# Patient Record
Sex: FEMALE | Race: BLACK OR AFRICAN AMERICAN | NOT HISPANIC OR LATINO | Employment: FULL TIME | URBAN - METROPOLITAN AREA
[De-identification: names, ages, dates, MRNs, and addresses within clinical notes are randomized per-mention and may not be internally consistent; named-entity substitution may affect disease eponyms.]

---

## 2023-12-07 ENCOUNTER — HOSPITAL ENCOUNTER (EMERGENCY)
Facility: HOSPITAL | Age: 28
Discharge: HOME | End: 2023-12-07
Payer: COMMERCIAL

## 2023-12-07 VITALS
HEART RATE: 88 BPM | RESPIRATION RATE: 16 BRPM | WEIGHT: 180 LBS | HEIGHT: 64 IN | BODY MASS INDEX: 30.73 KG/M2 | OXYGEN SATURATION: 98 % | TEMPERATURE: 97.7 F

## 2023-12-07 DIAGNOSIS — R10.32 LEFT INGUINAL PAIN: ICD-10-CM

## 2023-12-07 DIAGNOSIS — B96.89 BACTERIAL VAGINOSIS: Primary | ICD-10-CM

## 2023-12-07 DIAGNOSIS — N76.0 BACTERIAL VAGINOSIS: Primary | ICD-10-CM

## 2023-12-07 LAB
APPEARANCE UR: ABNORMAL
BILIRUB UR STRIP.AUTO-MCNC: NEGATIVE MG/DL
CLUE CELLS SPEC QL WET PREP: PRESENT
COLOR UR: YELLOW
GLUCOSE UR STRIP.AUTO-MCNC: NEGATIVE MG/DL
HCV AB SER QL: NONREACTIVE
HIV 1+2 AB+HIV1 P24 AG SERPL QL IA: NONREACTIVE
HOLD SPECIMEN: NORMAL
KETONES UR STRIP.AUTO-MCNC: ABNORMAL MG/DL
LEUKOCYTE ESTERASE UR QL STRIP.AUTO: ABNORMAL
MUCOUS THREADS #/AREA URNS AUTO: ABNORMAL /LPF
NITRITE UR QL STRIP.AUTO: NEGATIVE
PH UR STRIP.AUTO: 6 [PH]
PREGNANCY TEST URINE, POC: NEGATIVE
PROT UR STRIP.AUTO-MCNC: ABNORMAL MG/DL
RBC # UR STRIP.AUTO: NEGATIVE /UL
RBC #/AREA URNS AUTO: ABNORMAL /HPF
SP GR UR STRIP.AUTO: 1.03
SQUAMOUS #/AREA URNS AUTO: ABNORMAL /HPF
T PALLIDUM AB SER QL: NONREACTIVE
T VAGINALIS SPEC QL WET PREP: ABNORMAL
UROBILINOGEN UR STRIP.AUTO-MCNC: <2 MG/DL
WBC #/AREA URNS AUTO: ABNORMAL /HPF
WBC VAG QL WET PREP: ABNORMAL
YEAST VAG QL WET PREP: ABNORMAL

## 2023-12-07 PROCEDURE — 87389 HIV-1 AG W/HIV-1&-2 AB AG IA: CPT | Performed by: PHYSICIAN ASSISTANT

## 2023-12-07 PROCEDURE — 86803 HEPATITIS C AB TEST: CPT | Performed by: PHYSICIAN ASSISTANT

## 2023-12-07 PROCEDURE — 87800 DETECT AGNT MULT DNA DIREC: CPT | Performed by: PHYSICIAN ASSISTANT

## 2023-12-07 PROCEDURE — 99285 EMERGENCY DEPT VISIT HI MDM: CPT | Performed by: PHYSICIAN ASSISTANT

## 2023-12-07 PROCEDURE — 81025 URINE PREGNANCY TEST: CPT | Performed by: PHYSICIAN ASSISTANT

## 2023-12-07 PROCEDURE — 86780 TREPONEMA PALLIDUM: CPT | Performed by: PHYSICIAN ASSISTANT

## 2023-12-07 PROCEDURE — 2500000001 HC RX 250 WO HCPCS SELF ADMINISTERED DRUGS (ALT 637 FOR MEDICARE OP): Performed by: PHYSICIAN ASSISTANT

## 2023-12-07 PROCEDURE — 87210 SMEAR WET MOUNT SALINE/INK: CPT | Performed by: PHYSICIAN ASSISTANT

## 2023-12-07 PROCEDURE — 87086 URINE CULTURE/COLONY COUNT: CPT | Performed by: PHYSICIAN ASSISTANT

## 2023-12-07 PROCEDURE — 99284 EMERGENCY DEPT VISIT MOD MDM: CPT

## 2023-12-07 PROCEDURE — 99283 EMERGENCY DEPT VISIT LOW MDM: CPT

## 2023-12-07 PROCEDURE — 81001 URINALYSIS AUTO W/SCOPE: CPT | Performed by: PHYSICIAN ASSISTANT

## 2023-12-07 PROCEDURE — 36415 COLL VENOUS BLD VENIPUNCTURE: CPT | Performed by: PHYSICIAN ASSISTANT

## 2023-12-07 RX ORDER — METRONIDAZOLE 500 MG/1
500 TABLET ORAL 2 TIMES DAILY
Qty: 14 TABLET | Refills: 0 | Status: SHIPPED | OUTPATIENT
Start: 2023-12-07 | End: 2023-12-14

## 2023-12-07 RX ORDER — IBUPROFEN 600 MG/1
600 TABLET ORAL ONCE
Status: COMPLETED | OUTPATIENT
Start: 2023-12-07 | End: 2023-12-07

## 2023-12-07 RX ADMIN — IBUPROFEN 600 MG: 600 TABLET, FILM COATED ORAL at 15:54

## 2023-12-07 ASSESSMENT — COLUMBIA-SUICIDE SEVERITY RATING SCALE - C-SSRS
2. HAVE YOU ACTUALLY HAD ANY THOUGHTS OF KILLING YOURSELF?: NO
1. IN THE PAST MONTH, HAVE YOU WISHED YOU WERE DEAD OR WISHED YOU COULD GO TO SLEEP AND NOT WAKE UP?: NO
6. HAVE YOU EVER DONE ANYTHING, STARTED TO DO ANYTHING, OR PREPARED TO DO ANYTHING TO END YOUR LIFE?: NO

## 2023-12-07 NOTE — ED PROVIDER NOTES
HPI   Chief Complaint   Patient presents with    Groin Swelling       This is a 28-year-old female with no significant past medical history who presents to the emergency department with concern for left-sided groin pain and swelling for the past 3 days after sexual intercourse.  She denies noticeable vaginal discharge, has no itching.  Feels she has more swelling in the left side of her groin.  LMP was about 3 weeks ago.  Is on the Nexplanon, states was last put in about 2 years ago.  The patient denies nausea, vomiting, chest pain, shortness of breath, urinary symptoms, diarrhea, constipation.  Has not taken any medications for symptoms.  Denies any recent changes with topical products including soaps and detergents, no recent changes with her underwear.  Has been using cast Toradol without relief, otherwise has not taken any analgesics.  Would like to be tested for sexually transmitted infections.            [place] Coma Scale Score: 15                  Patient History   Past Medical History:   Diagnosis Date    Contact with and (suspected) exposure to infections with a predominantly sexual mode of transmission     Exposure to STD     Past Surgical History:   Procedure Laterality Date    CT ANGIO NECK W AND WO IV CONTRAST  3/1/2023    CT NECK ANGIO W AND WO IV CONTRAST [facility] CT     No family history on file.  Social History     Tobacco Use    Smoking status: Not on file    Smokeless tobacco: Not on file   Substance Use Topics    Alcohol use: Not on file    Drug use: Not on file       Physical Exam   ED Triage Vitals [12/07/23 1420]   Temp Heart Rate Resp BP   36.5 °C (97.7 °F) 88 16 --      SpO2 Temp src Heart Rate Source Patient Position   98 % -- -- --      BP Location FiO2 (%)     -- --       Physical Exam  Vitals and nursing note reviewed.   Constitutional:       Appearance: Normal appearance.   HENT:      Head: Normocephalic.   Cardiovascular:      Rate and Rhythm: Normal rate and regular rhythm.      Pulses:  "Normal pulses.      Heart sounds: Normal heart sounds.   Genitourinary:     General: Normal vulva.      Vagina: Vaginal discharge (Yellow) present.      Comments: Area of firmness palpated to the left groin lateral to the labia majora, tender, otherwise no overlying skin abnormalities.  Negative cervical motion tenderness.  Musculoskeletal:         General: Normal range of motion.   Neurological:      Mental Status: She is alert.         ED Course & MDM   Diagnoses as of 12/07/23 1847   Bacterial vaginosis       Medical Decision Making  28-year-old female who is presenting with concern for postcoital left groin pain and swelling.  Pelvic examination performed with female chaperone revealing an area of firmness in the left groin just lateral to the labia majora, no overlying skin abnormalities noted including lesions, ulcers.  Point-of-care ultrasound performed without evidence of fluid collection, possibly inflamed lymph node, though would need further workup if persists.  She does have a small amount of yellow discharge in the vaginal vault, cervix is without any lesions or bleeding, no significant adnexal tenderness or cervical motion tenderness noted.    The patient is very anxious, is in a monogamous relationship, but is concerned that this may be related to a sexually transmitted infection.  Her wet prep was positive for clue cells, negative for yeast and trichomonas, HIV and syphilis tests are negative.  Awaiting chlamydia and gonorrhea results.  The patient elected to wait for those results instead of getting treated today.  Urinalysis with possible infection, though given the patient is not having urinary symptoms, will defer to reflex culture.    At this time, the patient is stable for discharge home.  She was given a prescription for 7-day course of metronidazole, advised to follow-up with GYN as needed especially if her \"groin lump\" does not subside/resolve for further " workup.        Procedure  Procedures     Ermias Stallings PA-C  12/07/23 6996

## 2023-12-07 NOTE — ED TRIAGE NOTES
Pt presents to the ED c/o genital swelling that started three days ago after having sex. LMP was 12/1. Denies any bleeding.

## 2023-12-07 NOTE — Clinical Note
Amanuel Vergara was seen and treated in our emergency department on 12/7/2023.  She may return to work on 12/08/2023.       If you have any questions or concerns, please don't hesitate to call.      Ermias Stallings PA-C

## 2023-12-08 LAB
BACTERIA UR CULT: NORMAL
C TRACH RRNA SPEC QL NAA+PROBE: NEGATIVE
N GONORRHOEA DNA SPEC QL PROBE+SIG AMP: NEGATIVE

## 2023-12-09 ENCOUNTER — HOSPITAL ENCOUNTER (EMERGENCY)
Facility: HOSPITAL | Age: 28
Discharge: HOME | End: 2023-12-10
Attending: EMERGENCY MEDICINE
Payer: COMMERCIAL

## 2023-12-09 ENCOUNTER — APPOINTMENT (OUTPATIENT)
Dept: RADIOLOGY | Facility: HOSPITAL | Age: 28
End: 2023-12-09
Payer: COMMERCIAL

## 2023-12-09 VITALS
HEIGHT: 64 IN | BODY MASS INDEX: 30.73 KG/M2 | SYSTOLIC BLOOD PRESSURE: 120 MMHG | RESPIRATION RATE: 18 BRPM | HEART RATE: 86 BPM | OXYGEN SATURATION: 98 % | WEIGHT: 180 LBS | TEMPERATURE: 98.4 F | DIASTOLIC BLOOD PRESSURE: 71 MMHG

## 2023-12-09 DIAGNOSIS — L02.31 ABSCESS, GLUTEAL, LEFT: Primary | ICD-10-CM

## 2023-12-09 PROBLEM — F90.0 ATTENTION-DEFICIT HYPERACTIVITY DISORDER, PREDOMINANTLY INATTENTIVE TYPE: Status: ACTIVE | Noted: 2021-06-03

## 2023-12-09 LAB
ABO GROUP (TYPE) IN BLOOD: NORMAL
ALBUMIN SERPL BCP-MCNC: 4 G/DL (ref 3.4–5)
ALBUMIN SERPL BCP-MCNC: NORMAL G/DL
ALP SERPL-CCNC: 53 U/L (ref 33–110)
ALP SERPL-CCNC: NORMAL U/L
ALT SERPL W P-5'-P-CCNC: 8 U/L (ref 7–45)
ALT SERPL W P-5'-P-CCNC: NORMAL U/L
ANION GAP SERPL CALC-SCNC: 14 MMOL/L (ref 10–20)
ANION GAP SERPL CALC-SCNC: NORMAL MMOL/L
ANTIBODY SCREEN: NORMAL
APPEARANCE UR: CLEAR
AST SERPL W P-5'-P-CCNC: 10 U/L (ref 9–39)
AST SERPL W P-5'-P-CCNC: NORMAL U/L
BASOPHILS # BLD AUTO: 0.06 X10*3/UL (ref 0–0.1)
BASOPHILS NFR BLD AUTO: 0.3 %
BILIRUB SERPL-MCNC: 0.7 MG/DL (ref 0–1.2)
BILIRUB SERPL-MCNC: 0.7 MG/DL (ref 0–1.2)
BILIRUB UR STRIP.AUTO-MCNC: NEGATIVE MG/DL
BUN SERPL-MCNC: 7 MG/DL (ref 6–23)
BUN SERPL-MCNC: NORMAL MG/DL
CALCIUM SERPL-MCNC: 9.3 MG/DL (ref 8.6–10.6)
CALCIUM SERPL-MCNC: NORMAL MG/DL
CHLORIDE SERPL-SCNC: 104 MMOL/L (ref 98–107)
CHLORIDE SERPL-SCNC: NORMAL MMOL/L
CO2 SERPL-SCNC: 22 MMOL/L (ref 21–32)
CO2 SERPL-SCNC: NORMAL MMOL/L
COLOR UR: YELLOW
CREAT SERPL-MCNC: 0.71 MG/DL (ref 0.5–1.05)
CREAT SERPL-MCNC: NORMAL MG/DL
EOSINOPHIL # BLD AUTO: 0.01 X10*3/UL (ref 0–0.7)
EOSINOPHIL NFR BLD AUTO: 0.1 %
ERYTHROCYTE [DISTWIDTH] IN BLOOD BY AUTOMATED COUNT: 12.9 % (ref 11.5–14.5)
GFR SERPL CREATININE-BSD FRML MDRD: >90 ML/MIN/1.73M*2
GFR SERPL CREATININE-BSD FRML MDRD: NORMAL ML/MIN/{1.73_M2}
GLUCOSE SERPL-MCNC: 122 MG/DL (ref 74–99)
GLUCOSE SERPL-MCNC: NORMAL MG/DL
GLUCOSE UR STRIP.AUTO-MCNC: NEGATIVE MG/DL
HCG UR QL IA.RAPID: NEGATIVE
HCT VFR BLD AUTO: 40 % (ref 36–46)
HGB BLD-MCNC: 14.4 G/DL (ref 12–16)
HOLD SPECIMEN: NORMAL
IMM GRANULOCYTES # BLD AUTO: 0.11 X10*3/UL (ref 0–0.7)
IMM GRANULOCYTES NFR BLD AUTO: 0.6 % (ref 0–0.9)
KETONES UR STRIP.AUTO-MCNC: ABNORMAL MG/DL
LEUKOCYTE ESTERASE UR QL STRIP.AUTO: ABNORMAL
LIPASE SERPL-CCNC: 34 U/L (ref 9–82)
LYMPHOCYTES # BLD AUTO: 3.45 X10*3/UL (ref 1.2–4.8)
LYMPHOCYTES NFR BLD AUTO: 19.1 %
MCH RBC QN AUTO: 31 PG (ref 26–34)
MCHC RBC AUTO-ENTMCNC: 36 G/DL (ref 32–36)
MCV RBC AUTO: 86 FL (ref 80–100)
MONOCYTES # BLD AUTO: 1.37 X10*3/UL (ref 0.1–1)
MONOCYTES NFR BLD AUTO: 7.6 %
MUCOUS THREADS #/AREA URNS AUTO: ABNORMAL /LPF
NEUTROPHILS # BLD AUTO: 13.08 X10*3/UL (ref 1.2–7.7)
NEUTROPHILS NFR BLD AUTO: 72.3 %
NITRITE UR QL STRIP.AUTO: NEGATIVE
NRBC BLD-RTO: 0 /100 WBCS (ref 0–0)
PH UR STRIP.AUTO: 6 [PH]
PLATELET # BLD AUTO: 317 X10*3/UL (ref 150–450)
POTASSIUM SERPL-SCNC: 3.2 MMOL/L (ref 3.5–5.3)
POTASSIUM SERPL-SCNC: NORMAL MMOL/L
PROT SERPL-MCNC: 7.6 G/DL (ref 6.4–8.2)
PROT SERPL-MCNC: NORMAL G/DL
PROT UR STRIP.AUTO-MCNC: ABNORMAL MG/DL
RBC # BLD AUTO: 4.64 X10*6/UL (ref 4–5.2)
RBC # UR STRIP.AUTO: NEGATIVE /UL
RBC #/AREA URNS AUTO: ABNORMAL /HPF
RH FACTOR (ANTIGEN D): NORMAL
SODIUM SERPL-SCNC: 137 MMOL/L (ref 136–145)
SODIUM SERPL-SCNC: NORMAL MMOL/L
SP GR UR STRIP.AUTO: ABNORMAL
SQUAMOUS #/AREA URNS AUTO: ABNORMAL /HPF
UROBILINOGEN UR STRIP.AUTO-MCNC: 4 MG/DL
WBC # BLD AUTO: 18.1 X10*3/UL (ref 4.4–11.3)
WBC #/AREA URNS AUTO: ABNORMAL /HPF

## 2023-12-09 PROCEDURE — 99285 EMERGENCY DEPT VISIT HI MDM: CPT

## 2023-12-09 PROCEDURE — 84702 CHORIONIC GONADOTROPIN TEST: CPT

## 2023-12-09 PROCEDURE — 99282 EMERGENCY DEPT VISIT SF MDM: CPT | Performed by: SURGERY

## 2023-12-09 PROCEDURE — 96365 THER/PROPH/DIAG IV INF INIT: CPT

## 2023-12-09 PROCEDURE — 2500000004 HC RX 250 GENERAL PHARMACY W/ HCPCS (ALT 636 FOR OP/ED): Mod: SE

## 2023-12-09 PROCEDURE — 72193 CT PELVIS W/DYE: CPT

## 2023-12-09 PROCEDURE — 36415 COLL VENOUS BLD VENIPUNCTURE: CPT

## 2023-12-09 PROCEDURE — 85025 COMPLETE CBC W/AUTO DIFF WBC: CPT

## 2023-12-09 PROCEDURE — 72193 CT PELVIS W/DYE: CPT | Performed by: STUDENT IN AN ORGANIZED HEALTH CARE EDUCATION/TRAINING PROGRAM

## 2023-12-09 PROCEDURE — 96376 TX/PRO/DX INJ SAME DRUG ADON: CPT | Mod: 59

## 2023-12-09 PROCEDURE — 96375 TX/PRO/DX INJ NEW DRUG ADDON: CPT

## 2023-12-09 PROCEDURE — 82247 BILIRUBIN TOTAL: CPT | Mod: 59

## 2023-12-09 PROCEDURE — 81025 URINE PREGNANCY TEST: CPT

## 2023-12-09 PROCEDURE — 81001 URINALYSIS AUTO W/SCOPE: CPT

## 2023-12-09 PROCEDURE — 99221 1ST HOSP IP/OBS SF/LOW 40: CPT | Performed by: STUDENT IN AN ORGANIZED HEALTH CARE EDUCATION/TRAINING PROGRAM

## 2023-12-09 PROCEDURE — 10060 I&D ABSCESS SIMPLE/SINGLE: CPT

## 2023-12-09 PROCEDURE — 87075 CULTR BACTERIA EXCEPT BLOOD: CPT

## 2023-12-09 PROCEDURE — 87086 URINE CULTURE/COLONY COUNT: CPT

## 2023-12-09 PROCEDURE — 2550000001 HC RX 255 CONTRASTS: Mod: SE | Performed by: EMERGENCY MEDICINE

## 2023-12-09 PROCEDURE — 99284 EMERGENCY DEPT VISIT MOD MDM: CPT | Mod: 25 | Performed by: EMERGENCY MEDICINE

## 2023-12-09 PROCEDURE — 83690 ASSAY OF LIPASE: CPT

## 2023-12-09 PROCEDURE — 86901 BLOOD TYPING SEROLOGIC RH(D): CPT

## 2023-12-09 PROCEDURE — 96366 THER/PROPH/DIAG IV INF ADDON: CPT | Mod: 59

## 2023-12-09 PROCEDURE — 80053 COMPREHEN METABOLIC PANEL: CPT

## 2023-12-09 RX ORDER — SULFAMETHOXAZOLE AND TRIMETHOPRIM 800; 160 MG/1; MG/1
1 TABLET ORAL ONCE
Status: COMPLETED | OUTPATIENT
Start: 2023-12-09 | End: 2023-12-09

## 2023-12-09 RX ORDER — CEFTRIAXONE 1 G/50ML
1 INJECTION, SOLUTION INTRAVENOUS ONCE
Status: COMPLETED | OUTPATIENT
Start: 2023-12-09 | End: 2023-12-09

## 2023-12-09 RX ORDER — MORPHINE SULFATE 4 MG/ML
4 INJECTION INTRAVENOUS ONCE
Status: COMPLETED | OUTPATIENT
Start: 2023-12-09 | End: 2023-12-09

## 2023-12-09 RX ORDER — CEFTRIAXONE 1 G/50ML
1 INJECTION, SOLUTION INTRAVENOUS ONCE
Status: DISCONTINUED | OUTPATIENT
Start: 2023-12-09 | End: 2023-12-09

## 2023-12-09 RX ORDER — LORAZEPAM 2 MG/ML
0.5 INJECTION INTRAMUSCULAR ONCE
Status: COMPLETED | OUTPATIENT
Start: 2023-12-09 | End: 2023-12-09

## 2023-12-09 RX ORDER — POTASSIUM CHLORIDE 14.9 MG/ML
20 INJECTION INTRAVENOUS
Status: DISPENSED | OUTPATIENT
Start: 2023-12-09 | End: 2023-12-09

## 2023-12-09 RX ORDER — HYDROMORPHONE HYDROCHLORIDE 1 MG/ML
0.4 INJECTION, SOLUTION INTRAMUSCULAR; INTRAVENOUS; SUBCUTANEOUS ONCE
Status: COMPLETED | OUTPATIENT
Start: 2023-12-09 | End: 2023-12-09

## 2023-12-09 RX ORDER — NAPROXEN 500 MG/1
500 TABLET ORAL 2 TIMES DAILY PRN
Qty: 30 TABLET | Refills: 0 | Status: SHIPPED | OUTPATIENT
Start: 2023-12-09

## 2023-12-09 RX ORDER — HYDROMORPHONE HYDROCHLORIDE 1 MG/ML
0.2 INJECTION, SOLUTION INTRAMUSCULAR; INTRAVENOUS; SUBCUTANEOUS ONCE
Status: COMPLETED | OUTPATIENT
Start: 2023-12-09 | End: 2023-12-09

## 2023-12-09 RX ORDER — SULFAMETHOXAZOLE AND TRIMETHOPRIM 800; 160 MG/1; MG/1
1 TABLET ORAL EVERY 12 HOURS
Qty: 14 TABLET | Refills: 0 | Status: SHIPPED | OUTPATIENT
Start: 2023-12-09 | End: 2023-12-16

## 2023-12-09 RX ADMIN — MORPHINE SULFATE 4 MG: 4 INJECTION INTRAVENOUS at 14:10

## 2023-12-09 RX ADMIN — SODIUM CHLORIDE 1000 ML: 9 INJECTION, SOLUTION INTRAVENOUS at 20:14

## 2023-12-09 RX ADMIN — SULFAMETHOXAZOLE AND TRIMETHOPRIM 1 TABLET: 800; 160 TABLET ORAL at 22:36

## 2023-12-09 RX ADMIN — HYDROMORPHONE HYDROCHLORIDE 0.2 MG: 1 INJECTION, SOLUTION INTRAMUSCULAR; INTRAVENOUS; SUBCUTANEOUS at 20:25

## 2023-12-09 RX ADMIN — LORAZEPAM 0.5 MG: 2 INJECTION INTRAMUSCULAR; INTRAVENOUS at 20:55

## 2023-12-09 RX ADMIN — HYDROMORPHONE HYDROCHLORIDE 0.4 MG: 1 INJECTION, SOLUTION INTRAMUSCULAR; INTRAVENOUS; SUBCUTANEOUS at 17:31

## 2023-12-09 RX ADMIN — IOHEXOL 80 ML: 350 INJECTION, SOLUTION INTRAVENOUS at 15:45

## 2023-12-09 RX ADMIN — HYDROMORPHONE HYDROCHLORIDE 0.4 MG: 1 INJECTION, SOLUTION INTRAMUSCULAR; INTRAVENOUS; SUBCUTANEOUS at 20:52

## 2023-12-09 RX ADMIN — CEFTRIAXONE SODIUM 1 G: 1 INJECTION, SOLUTION INTRAVENOUS at 14:45

## 2023-12-09 RX ADMIN — POTASSIUM CHLORIDE 20 MEQ: 14.9 INJECTION, SOLUTION INTRAVENOUS at 20:14

## 2023-12-09 ASSESSMENT — COLUMBIA-SUICIDE SEVERITY RATING SCALE - C-SSRS
6. HAVE YOU EVER DONE ANYTHING, STARTED TO DO ANYTHING, OR PREPARED TO DO ANYTHING TO END YOUR LIFE?: NO
1. IN THE PAST MONTH, HAVE YOU WISHED YOU WERE DEAD OR WISHED YOU COULD GO TO SLEEP AND NOT WAKE UP?: NO
2. HAVE YOU ACTUALLY HAD ANY THOUGHTS OF KILLING YOURSELF?: NO

## 2023-12-09 ASSESSMENT — PAIN SCALES - GENERAL
PAINLEVEL_OUTOF10: 10 - WORST POSSIBLE PAIN
PAINLEVEL_OUTOF10: 6
PAINLEVEL_OUTOF10: 10 - WORST POSSIBLE PAIN

## 2023-12-09 ASSESSMENT — PAIN DESCRIPTION - ORIENTATION
ORIENTATION: LEFT
ORIENTATION: RIGHT
ORIENTATION: LEFT

## 2023-12-09 ASSESSMENT — PAIN DESCRIPTION - LOCATION
LOCATION: GROIN

## 2023-12-09 ASSESSMENT — PAIN - FUNCTIONAL ASSESSMENT
PAIN_FUNCTIONAL_ASSESSMENT: 0-10

## 2023-12-09 ASSESSMENT — PAIN DESCRIPTION - PROGRESSION: CLINICAL_PROGRESSION: GRADUALLY IMPROVING

## 2023-12-09 ASSESSMENT — PAIN DESCRIPTION - DESCRIPTORS: DESCRIPTORS: ACHING

## 2023-12-09 ASSESSMENT — PAIN DESCRIPTION - ONSET: ONSET: ONGOING

## 2023-12-09 ASSESSMENT — PAIN DESCRIPTION - PAIN TYPE: TYPE: ACUTE PAIN

## 2023-12-09 ASSESSMENT — PAIN DESCRIPTION - FREQUENCY: FREQUENCY: CONSTANT/CONTINUOUS

## 2023-12-09 NOTE — ED TRIAGE NOTES
Pt was here a few days ago dx with BV has taken the meds X 2 days, but has not helped pt coming in today with vaginal swelling and pain

## 2023-12-09 NOTE — Clinical Note
Amanuel Vergara was seen and treated in our emergency department on 12/9/2023.  She may return to work on 12/12/2023.       If you have any questions or concerns, please don't hesitate to call.      Fadumo Miranda PA-C

## 2023-12-09 NOTE — ED PROVIDER NOTES
This patient was seen by the advanced practice provider.  I have personally performed a substantive portion of the encounter.  I have seen and examined the patient; agree with the workup, evaluation, MDM, management and diagnosis.  The care plan has been discussed.      My assessment reveals a 28-year-old female who presents with abscess to the vaginal and perennial area.  Performed chaperoned exam with Fadumo MONTEIRO at bedside.  Has abscess to the left labia that extends into the perineum, no perirectal involvement.  Very tender to palpation with fluctuance.  No active drainage.     Flo Thakur MD MPH  12/09/23 1503

## 2023-12-09 NOTE — ED PROVIDER NOTES
HPI   Chief Complaint   Patient presents with   • Groin Swelling     Vaginal swelling and pain        Patient is a 28-year-old female with no significant past medical history that presents today with groin swelling.  Was here 2 days ago for similar complaints.  In a monogamous relationship without any concern for STDs.  Patient received vaginal swabs for STIs, yeast and BV.  Everything came back negative except for BV and was sent home on Flagyl.  Per previous provider's note, point-of-care ultrasound was performed on the left groin where there is no evidence of fluid collection so it was determined that she likely had some possible inflamed lymph node.    Coming in today for increasing pain in her groin.  Denies any nausea, vomiting, fever or chills or abdominal pain.  Notes that the pain is distinctly in her groin where the lump is.  Notes that the swelling has increased in size.  She does report that since she has been taking the Flagyl for the BV, she has had brown/red urine.  Denying any symptoms, no flank pain.                          [place] Coma Scale Score: 15                  Patient History   Past Medical History:   Diagnosis Date   • Contact with and (suspected) exposure to infections with a predominantly sexual mode of transmission     Exposure to STD     Past Surgical History:   Procedure Laterality Date   • CT ANGIO NECK W AND WO IV CONTRAST  3/1/2023    CT NECK ANGIO W AND WO IV CONTRAST [facility] CT     No family history on file.  Social History     Tobacco Use   • Smoking status: Not on file   • Smokeless tobacco: Not on file   Substance Use Topics   • Alcohol use: Not on file   • Drug use: Not on file       Physical Exam   ED Triage Vitals [12/09/23 1330]   Temp Pulse Resp BP   36.9 °C (98.4 °F) -- 16 --      SpO2 Temp src Heart Rate Source Patient Position   98 % -- -- --      BP Location FiO2 (%)     -- --       Physical Exam  Vitals and nursing note reviewed. Exam conducted with a chaperone present.    Constitutional:       General: She is not in acute distress.     Appearance: Normal appearance. She is not ill-appearing.   HENT:      Head: Normocephalic and atraumatic.      Right Ear: External ear normal.      Left Ear: External ear normal.      Nose: Nose normal. No congestion or rhinorrhea.      Mouth/Throat:      Mouth: Mucous membranes are moist.      Pharynx: Oropharynx is clear. No oropharyngeal exudate or posterior oropharyngeal erythema.   Eyes:      Extraocular Movements: Extraocular movements intact.      Conjunctiva/sclera: Conjunctivae normal.      Pupils: Pupils are equal, round, and reactive to light.   Cardiovascular:      Rate and Rhythm: Normal rate and regular rhythm.      Heart sounds: Normal heart sounds.   Pulmonary:      Effort: No accessory muscle usage or respiratory distress.      Breath sounds: Normal breath sounds. No wheezing, rhonchi or rales.   Abdominal:      General: Abdomen is flat. Bowel sounds are normal. There is no distension.      Palpations: Abdomen is soft.      Tenderness: There is no abdominal tenderness. There is no right CVA tenderness or left CVA tenderness.   Genitourinary:     Labia:         Right: No rash or lesion.         Left: No rash or lesion.       Rectum: Normal.          Comments: Distinct induration, swelling, warmth and tenderness to L side of perineum. No swelling or pain to labia.  Musculoskeletal:         General: No swelling or deformity. Normal range of motion.      Cervical back: Normal range of motion and neck supple.      Right lower leg: No edema.      Left lower leg: No edema.   Skin:     General: Skin is warm and dry.      Capillary Refill: Capillary refill takes less than 2 seconds.   Neurological:      General: No focal deficit present.      Mental Status: She is alert and oriented to person, place, and time.      GCS: GCS eye subscore is 4. GCS verbal subscore is 5. GCS motor subscore is 6.      Cranial Nerves: Cranial nerves 2-12 are  intact.      Sensory: No sensory deficit.      Motor: Motor function is intact. No weakness.   Psychiatric:         Mood and Affect: Mood and affect normal.         Speech: Speech normal.         Behavior: Behavior normal. Behavior is cooperative.         ED Course & MDM   ED Course as of 12/09/23 2218   Sat Dec 09, 2023   1525 CBC and Auto Differential(!)  WBC 18. No signs of acute anemia [ML]   1623 CT pelvis w IV contrast   3 cm x 2.5 cm x 4.5 cm abscess collection with internal  septations and surrounding inflammatory fat stranding located in the  left perineum just inferolateral to the vaginal introitus. This may  represent an infected Bartholin gland cyst or simple subcutaneous  abscess.  2. New enlarged left inguinal lymph node, likely reactive.   [ML]   1719 Lipase  wnl [ML]   1723 Comprehensive metabolic panel  Sample hemolyzed.  Will redraw labs [ML]   1729 hCG, Urine, Qualitative  negative [ML]   1742 OBGYN thinking that abscess may need to be drained in OR. Will order T+S [ML]   1947 Discussed case with OB/GYN after attending saw this patient.  They think that is more perineal involved in less in the labia so they want [facility] to evaluate patient.  They discussed the case themselves with [facility] and [facility] at that they will come down to see her. [ML]   2106 [facility] came to see patient. Going to attempt I+D. Will give ativan and dilaudid prior to procedure here in ED [ML]      ED Course User Index  [ML] Fadumo Miranda PA-C         Diagnoses as of 12/09/23 2218   Abscess, gluteal, left       Medical Decision Making  Patient is a 28-year-old female that presents today for groin pain and swelling.  Seen here 2 days ago and evaluated by a provider here.  Received ultrasound of the swelling to her groin that she notes happened after sex.  Point-of-care ultrasound showed no fluid collection so they presumed to be lymphadenopathy.  She received STD swabs.  All came back negative including gonorrhea, chlamydia, yeast and  trichomonas.  There were clue cells noted on wet prep so she was sent home for possible BV.  Reports that she has had increasing pain to her genital as well as increasing swelling down there.  Reports no fever chills or flank pain.  Denies any abdominal pain.  On evaluation of results, there is a large amount of leukocyte Estrace in her urine as well as white blood cells though it does appear to be very contaminated.  She was not sent home on any antibiotics to treat UTI.  Culture shows no current growth after 2 days.  I did give her a dose of Rocephin here in the ED in case this is UTI/Nik though she has no tenderness to her CVA.  On evaluation of , there is a large amount of cellulitis and swelling to the left perineum.  There is no tenderness down to her rectum or up to her labia and is strictly on the left side of her perineum.  Due to the large amount of pain and distress this patient is an from the swelling, I was unable to do an internal  exam.  Decided to go ahead and do a CT of the pelvis to evaluate for abscess as point-of-care ultrasound showed no abscess prior.  Also do abdominal labs and repeated UA.  She is afebrile here in the ED, not tachycardic or hypoxic.  BP is within normal limits, low concern for SIRS at this time.  CMP shows glucose of 122 and potassium of 3.2.  She has no other electrolyte abnormalities, GIRISH or elevated LFTs.  Will do IV potassium and replete here in order to keep her n.p.o. in case she needs surgery.  CBC does show an elevated white count at 18.  Negative pregnancy, urinalysis shows a small amount of leukocyte Estrace without any blood in her urine.  Will defer any more antibiotics for UTI until culture fully grows.  Lipase was within normal limits.  Type and screen was sent off.    CT of pelvis revealing a 3 cm x 2.5 cm x 4.5 cm abscess collection with internal septations in the left perineum just inferior lateral to the vaginal introitus.    Originally consulted [facility]  though after evaluation of CT scan from them, considering how close it is to the vagina, they wanted me to consult OB/GYN.  Had OB/GYN come down to see this patient.  Originally, stated that they would take her up to the OR, though after attending OB/GYN came down to see this patient with full exam of internal vagina, there is no vaginal involvement and there is no noted abscess to the labia so they wanted [facility] to come down and see them.  [facility] came down to to evaluate this patient and decided to do an I&D here in the ED. Per [facility], there successful in removing a large amount of fluid from her abscess here in the ED.  Recommending sending this patient home on Bactrim for a week and having her follow-up with [facility] in their outpatient clinic.  Given patient's instructions on when to come back to the ED, she will go home on ibuprofen or Tylenol as needed for pain.  At this time, patient is stable and okay for discharge.        Procedure  Procedures     Fadumo Miranda PA-C  12/09/23 1458

## 2023-12-09 NOTE — CONSULTS
Reason For Consult  C/f labial abscess    History Of Present Illness  Amanuel Vergara is a 28 y.o. female presenting with perineal/perirectal swelling and pain. Feels pressure near her rectum. Started 3 days ago. Also had some irritating discharge, was seen in ED 2 days ago for discharge, STI tests negative, BV pos, sent home with flagyl. Pain and swelling since worsening, now intolerable. No n/v, f/c, trouble urinating or with BM. No abn VB.     ObGyn History  Denies hx STIs. Nexplanon in place, placed July 2021. Regular periods, not very heavy or painful, LMP 3 weeks ago     Past Medical History  Denies PMHx including hx of HTN, DM    Surgical History  Back I&D    Social History  She has no history on file for tobacco use, alcohol use, and drug use.    Family History  No family history on file.     Allergies  Patient has no known allergies.    Review of Systems  Review of Systems   All other systems reviewed and are negative.    Physical Exam  Physical Exam  Constitutional:       Appearance: Normal appearance.   HENT:      Head: Normocephalic and atraumatic.      Nose: Nose normal.      Mouth/Throat:      Mouth: Mucous membranes are moist.   Eyes:      Extraocular Movements: Extraocular movements intact.   Pulmonary:      Effort: Pulmonary effort is normal.   Chest:      Chest wall: No mass or deformity.   Breasts:     Breasts are symmetrical.      Right: Normal.      Left: Normal.   Abdominal:      General: Abdomen is flat. There is no distension.      Palpations: Abdomen is soft.      Tenderness: There is no abdominal tenderness.   Genitourinary:     Comments: Normal appearing mons, labia minora, vaginal introitus. No abnormal vaginal discharge noted on external exam. Normal R labia majora. L labia majora with reactive swelling without fluctuance or induration. No fluctuance or induration palpated with vaginal exam, tolerated vaginal exam without pain. 3-4cm area of induration further posterior and lateral  "to labia, most significant TTP in ernie-rectal area. No drainage  Musculoskeletal:         General: Normal range of motion.      Cervical back: Normal range of motion.   Lymphadenopathy:      Upper Body:      Right upper body: No supraclavicular or axillary adenopathy.      Left upper body: No supraclavicular or axillary adenopathy.   Skin:     General: Skin is warm and dry.   Neurological:      General: No focal deficit present.      Mental Status: She is alert and oriented to person, place, and time.   Psychiatric:         Mood and Affect: Mood normal.         Behavior: Behavior normal.       Last Recorded Vitals  Blood pressure 120/71, pulse 86, temperature 36.9 °C (98.4 °F), resp. rate 18, height 1.626 m (5' 4\"), weight 81.6 kg (180 lb), SpO2 98 %.    Relevant Results  Lab Results   Component Value Date    WBC 18.1 (H) 12/09/2023    HGB 14.4 12/09/2023    HCT 40.0 12/09/2023     12/09/2023     Lab Results   Component Value Date    GLUCOSE 122 (H) 12/09/2023     12/09/2023    K 3.2 (L) 12/09/2023     12/09/2023    CO2 22 12/09/2023    ANIONGAP 14 12/09/2023    BUN 7 12/09/2023    CREATININE 0.71 12/09/2023    EGFR >90 12/09/2023    CALCIUM 9.3 12/09/2023    ALBUMIN 4.0 12/09/2023    PROT 7.6 12/09/2023    ALKPHOS 53 12/09/2023    ALT 8 12/09/2023    AST 10 12/09/2023    BILITOT 0.7 12/09/2023     CT pelvis w IV contrast  12/9/2023  1.  3 cm x 2.5 cm x 4.5 cm abscess collection with internal septations and surrounding inflammatory fat stranding located in the left perineum just inferolateral to the vaginal introitus. This may represent an infected Bartholin gland cyst or simple subcutaneous abscess. 2. New enlarged left inguinal lymph node, likely reactive.     Assessment/Plan   Amanuel Vergara is a 28 y.o. female presenting with perineal/perirectal swelling and pain.     On exam, no e/o bartholin's gland abscess or vaginal/labial abscess. Exam more consistent with ernie-rectal abscess. On " review of imaging, abscess appears to be deep in plane between vagina and rectum and does not appear to have superficial skin involvement.    - Recommend [facility] consult to evaluate for perirectal involvement  - Will likely require EUA and I&D vs IR drainage given significant pain and likely deep involvement of abscess. Will discuss with [facility]. Keep NPO  - Abx plan also to be discussed with [facility]. Has gotten 1 dose ceftriaxone for dirty UA and currently taking flagyl for BV  - Pain control PRN    Seen and d/w Dr. Chong    Update: d/w [facility], they plan to I&D likely gluteal abscess. Further management per [facility]/ED. Gyn to sign off, please page with any concerns    Laureen Rueda MD  PGY-3, Obstetrics and Gynecology  Gyn Pager: [phone]

## 2023-12-10 VITALS
OXYGEN SATURATION: 97 % | BODY MASS INDEX: 30.73 KG/M2 | HEIGHT: 64 IN | SYSTOLIC BLOOD PRESSURE: 117 MMHG | RESPIRATION RATE: 18 BRPM | DIASTOLIC BLOOD PRESSURE: 72 MMHG | WEIGHT: 180 LBS | TEMPERATURE: 98.2 F | HEART RATE: 107 BPM

## 2023-12-10 DIAGNOSIS — Z51.89 WOUND CHECK, ABSCESS: Primary | ICD-10-CM

## 2023-12-10 LAB — BACTERIA UR CULT: NORMAL

## 2023-12-10 PROCEDURE — 2500000005 HC RX 250 GENERAL PHARMACY W/O HCPCS: Mod: SE

## 2023-12-10 PROCEDURE — 99283 EMERGENCY DEPT VISIT LOW MDM: CPT | Performed by: EMERGENCY MEDICINE

## 2023-12-10 PROCEDURE — 2500000004 HC RX 250 GENERAL PHARMACY W/ HCPCS (ALT 636 FOR OP/ED): Mod: SE

## 2023-12-10 RX ORDER — POTASSIUM CHLORIDE 20 MEQ/1
TABLET, EXTENDED RELEASE ORAL
Status: DISCONTINUED
Start: 2023-12-10 | End: 2023-12-10 | Stop reason: HOSPADM

## 2023-12-10 RX ORDER — ACETAMINOPHEN 325 MG/1
975 TABLET ORAL ONCE
Status: COMPLETED | OUTPATIENT
Start: 2023-12-10 | End: 2023-12-10

## 2023-12-10 RX ORDER — ONDANSETRON 4 MG/1
4 TABLET, FILM COATED ORAL EVERY 8 HOURS PRN
Qty: 20 TABLET | Refills: 0 | Status: SHIPPED | OUTPATIENT
Start: 2023-12-10

## 2023-12-10 RX ORDER — POTASSIUM CHLORIDE 20 MEQ/1
20 TABLET, EXTENDED RELEASE ORAL ONCE
Status: COMPLETED | OUTPATIENT
Start: 2023-12-10 | End: 2023-12-10

## 2023-12-10 RX ORDER — ONDANSETRON 4 MG/1
TABLET, FILM COATED ORAL
Status: DISCONTINUED
Start: 2023-12-10 | End: 2023-12-10 | Stop reason: HOSPADM

## 2023-12-10 RX ORDER — ONDANSETRON 4 MG/1
4 TABLET, FILM COATED ORAL ONCE
Status: COMPLETED | OUTPATIENT
Start: 2023-12-10 | End: 2023-12-10

## 2023-12-10 RX ADMIN — ACETAMINOPHEN 975 MG: 325 TABLET ORAL at 16:40

## 2023-12-10 RX ADMIN — POTASSIUM CHLORIDE 20 MEQ: 1500 TABLET, EXTENDED RELEASE ORAL at 00:12

## 2023-12-10 RX ADMIN — ONDANSETRON HYDROCHLORIDE 4 MG: 4 TABLET, FILM COATED ORAL at 00:12

## 2023-12-10 ASSESSMENT — COLUMBIA-SUICIDE SEVERITY RATING SCALE - C-SSRS
1. IN THE PAST MONTH, HAVE YOU WISHED YOU WERE DEAD OR WISHED YOU COULD GO TO SLEEP AND NOT WAKE UP?: NO
6. HAVE YOU EVER DONE ANYTHING, STARTED TO DO ANYTHING, OR PREPARED TO DO ANYTHING TO END YOUR LIFE?: NO
2. HAVE YOU ACTUALLY HAD ANY THOUGHTS OF KILLING YOURSELF?: NO

## 2023-12-10 NOTE — PROCEDURES
The patient was prepped and draped in the standard fashion.  15 mL of 1% lidocaine was injected in the left inferior gluteal region.  Using 11 blade a stab incision was made dorsally 1.5 cm and was extended to a cruciate incision 0.5 cm in either direction.  Using a curved hemostat, the abscess pocket was entered and several loculations were broken up using blunt dissection.  The wound is copiously irrigated with 300 cc of normal saline.  A counterincision was made with an 11 blade ventrally and a vessel loop was passed through the tracks twice and secured with an 0 silk suture.  The wound was inspected for hemostasis and a dressing was placed over top.

## 2023-12-10 NOTE — DISCHARGE INSTRUCTIONS
Surgery opened up the abscess to facilitate drainage And healing to the area.  Water they placed in the wound, keep it in there, please do not tug at it.  Please take antibiotics twice a day until you are finished with the medication.  I gave you the first dose here in the ER so you do not have to worry about starting the medication until tomorrow morning.  Please do sitz bath's twice a day.  Can  supplies at any pharmacy for this.  Please follow-up with surgery in 1 week for reevaluation of the wound.  If you notice increasing pain, redness or increasing swelling, fever or chills, please come back to the ER for reevaluation.  Please take naproxen once every 12 hours as needed for pain.  Can also take 1 extra strength Tylenol or 2 regular strength Tylenol as once every 6 hours as needed for pain.

## 2023-12-10 NOTE — CONSULTS
"Reason For Consult  Left gluteal abscess    History Of Present Illness  Amanuel Vergara is a 28 y.o. female presenting with a left gluteal abscess.  Her symptoms started on Wednesday and have since gotten worse.  She was seen in the ED 2 days prior and was evaluated, the workup was only notable for being BV positive, sent home with Flagyl.  She has had worsening pain and pressure and unable to sit on it with comfort..     Past Medical History  She has a past medical history of Contact with and (suspected) exposure to infections with a predominantly sexual mode of transmission.    Surgical History  She has a past surgical history that includes CT angio neck (3/1/2023).     Social History  She has no history on file for tobacco use, alcohol use, and drug use.    Family History  No family history on file.     Allergies  Patient has no known allergies.    Review of Systems  Negative unless otherwise specified in HPI     Physical Exam  Constitutional: no acute distress  Neuro: A/O x4, no gross deficits   Psych: normal affect  HEENT: Normocephalic, atraumatic, no scleral icterus, EOMI  Cardiac: RRR  Pulmonary: unlabored respirations   Abdomen: soft, non distended, non tender  Skin: warm and dry overall   Extremities: moving all four, no swelling noted   Gluteus: There is a 3 x 1 cm tender fluctuant area in the left inferior gluteal fold     Last Recorded Vitals  Blood pressure 120/71, pulse 86, temperature 36.9 °C (98.4 °F), resp. rate 18, height 1.626 m (5' 4\"), weight 81.6 kg (180 lb), SpO2 98 %.    Relevant Results  CT pelvis w IV contrast    Result Date: 12/9/2023  Interpreted By:  Jose Puckett and Stephens Katherine STUDY: CT PELVIS W IV CONTRAST;  12/9/2023 3:45 pm   INDICATION: Signs/Symptoms:perneal swelling. Per EMR patient is a 28-year-old female presenting with left groin swelling for 5 days. Presented to the ED 2 days ago and was diagnosed and treated for BV that time.   COMPARISON: CT abdomen " pelvis 04/11/2021   ACCESSION NUMBER(S): [id number]   ORDERING CLINICIAN: TRENA LONG   TECHNIQUE: CT of the pelvis was performed.  Standard contiguous axial images were obtained at 3 mm slice thickness through pelvis. Coronal and sagittal reconstructions at 3 mm slice thickness were performed.  80 ml of contrast  Omnipaque 350 were administered intravenously without immediate complication.   FINDINGS: PELVIS:   BLADDER: The urinary bladder wall thickness appears within normal limits for degree of distention.   REPRODUCTIVE ORGANS: No significant abnormality.   BOWEL: The visualized small and large bowel are normal in caliber and demonstrate no wall thickening.  The appendix appears normal.   PERITONEUM/RETROPERITONEUM/LYMPH NODES: No ascites or free air, no fluid collection.  New enlarged left inguinal node measuring 1.4 cm (series 201, image 60).   BONES: No acute osseous abnormality or suspicious osseous lesions. Mild bilateral hip osteoarthrosis.   ABDOMINAL WALL: There is a 3 cm x 2.5 cm x 4.5 cm loculated rim enhancing fluid collection with internal septations and surrounding inflammatory fat stranding located in the left perineum just inferolateral to the vaginal introitus.       1.  3 cm x 2.5 cm x 4.5 cm abscess collection with internal septations and surrounding inflammatory fat stranding located in the left perineum just inferolateral to the vaginal introitus. This may represent an infected Bartholin gland cyst or simple subcutaneous abscess. 2. New enlarged left inguinal lymph node, likely reactive.   I personally reviewed the images/study and I agree with Joslyn Barron DO's (radiology resident) findings as stated. This study was interpreted at [facility], [place], [place].   MACRO: None   Signed by: Jose Puckett 12/9/2023 4:20 PM Dictation workstation:   [id number]        Assessment/Plan     This is an otherwise healthy 28 old woman who presents with  several days of a worsening gluteal swelling and pain, found to have a left gluteal abscess.  She underwent incision and drainage at the bedside which she tolerated well.    Recommendations:  Bedside incision and drainage performed, seton placed  Wound cultures x 2 sent  7-10 day course of Bactrim  Twice daily sitz bath's  Follow-up in [facility] clinic in 1 week      Paresh Damon MD

## 2023-12-10 NOTE — ED PROVIDER NOTES
HPI   Chief Complaint   Patient presents with    Vaginal Discharge       HPI  Patient is a 28-year-old with no past medical history presenting with posterior drainage  Dressing issue.  Patient was here and had a procedure done by [facility].  Patient presented yesterday complaining of groin pain.  CT scan showed an abscess at the left perineum just inferior to the vaginal inotrius.  Patient said her dressing fell off and that is why she is here.  She denies any fever, chills, nausea and vomiting.                  [place] Coma Scale Score: 15                  Patient History   Past Medical History:   Diagnosis Date    Contact with and (suspected) exposure to infections with a predominantly sexual mode of transmission     Exposure to STD     Past Surgical History:   Procedure Laterality Date    CT ANGIO NECK  3/1/2023    CT NECK ANGIO W AND WO IV CONTRAST [facility] CT     No family history on file.  Social History     Tobacco Use    Smoking status: Not on file    Smokeless tobacco: Not on file   Substance Use Topics    Alcohol use: Not on file    Drug use: Not on file       Physical Exam   ED Triage Vitals [12/10/23 1327]   Temp Heart Rate Resp BP   37.8 °C (100 °F) 107 18 117/72      SpO2 Temp Source Heart Rate Source Patient Position   97 % Tympanic -- --      BP Location FiO2 (%)     -- --       Physical Exam  Constitutional:       Appearance: Normal appearance. She is normal weight.   HENT:      Head: Normocephalic and atraumatic.      Nose: Nose normal.   Cardiovascular:      Rate and Rhythm: Normal rate and regular rhythm.      Pulses: Normal pulses.      Heart sounds: Normal heart sounds.   Pulmonary:      Effort: Pulmonary effort is normal.      Breath sounds: Normal breath sounds.   Abdominal:      General: Abdomen is flat. Bowel sounds are normal.      Palpations: Abdomen is soft.   Genitourinary:     General: Normal vulva.      Comments: Left inferior vaginal inotrious site showed no sign of infection, no erythema, no  drainage. Had a rubber band at the site  Musculoskeletal:      Cervical back: Normal range of motion.   Skin:     General: Skin is warm.   Neurological:      General: No focal deficit present.      Mental Status: She is alert and oriented to person, place, and time. Mental status is at baseline.         ED Course & MDM        Medical Decision Making  Patient is a 28-year-old with no past medical history presenting with post incision and drainage  dressing falling off. Patient was had a procedure done by [facility]. Patient presented yesterday complaining of groin pain. CT scan showed an abscess at the left perineum just inferior to the vaginal inotrius.  The [facility] team performed I&D and prescribed the patient antibiotics. After looking at the area where the I&D was performed, there is no sign of infection, erythema, or any concern. At bedside patient is comfortable with stable vitals(98.2 temperature).  Patient was then discharged.    Procedure  Procedures     Seven Romero MD  Resident  12/10/23 6691

## 2023-12-11 LAB
B-LACTAMASE ORGANISM ISLT: NEGATIVE
B-LACTAMASE ORGANISM ISLT: POSITIVE
BACTERIA SPEC CULT: ABNORMAL
GRAM STN SPEC: ABNORMAL

## 2023-12-12 ENCOUNTER — APPOINTMENT (OUTPATIENT)
Dept: RADIOLOGY | Facility: HOSPITAL | Age: 28
End: 2023-12-12
Payer: COMMERCIAL

## 2023-12-12 ENCOUNTER — TELEPHONE (OUTPATIENT)
Dept: PHARMACY | Facility: HOSPITAL | Age: 28
End: 2023-12-12
Payer: COMMERCIAL

## 2023-12-12 ENCOUNTER — HOSPITAL ENCOUNTER (EMERGENCY)
Facility: HOSPITAL | Age: 28
Discharge: HOME | End: 2023-12-13
Payer: COMMERCIAL

## 2023-12-12 VITALS
HEART RATE: 104 BPM | OXYGEN SATURATION: 98 % | RESPIRATION RATE: 18 BRPM | SYSTOLIC BLOOD PRESSURE: 159 MMHG | DIASTOLIC BLOOD PRESSURE: 89 MMHG | TEMPERATURE: 98.4 F

## 2023-12-12 DIAGNOSIS — R10.32 LEFT INGUINAL PAIN: ICD-10-CM
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
## 2023-12-12 LAB — B-HCG SERPL-ACNC: 4 MIU/ML

## 2023-12-12 PROCEDURE — 99284 EMERGENCY DEPT VISIT MOD MDM: CPT

## 2023-12-12 PROCEDURE — 2500000001 HC RX 250 WO HCPCS SELF ADMINISTERED DRUGS (ALT 637 FOR MEDICARE OP): Mod: SE | Performed by: PHYSICIAN ASSISTANT

## 2023-12-12 PROCEDURE — 70450 CT HEAD/BRAIN W/O DYE: CPT

## 2023-12-12 PROCEDURE — 99284 EMERGENCY DEPT VISIT MOD MDM: CPT | Mod: 25 | Performed by: PHYSICIAN ASSISTANT

## 2023-12-12 PROCEDURE — 70450 CT HEAD/BRAIN W/O DYE: CPT | Performed by: RADIOLOGY

## 2023-12-12 PROCEDURE — 99284 EMERGENCY DEPT VISIT MOD MDM: CPT | Performed by: PHYSICIAN ASSISTANT

## 2023-12-12 RX ORDER — OXYCODONE AND ACETAMINOPHEN 5; 325 MG/1; MG/1
1 TABLET ORAL ONCE
Status: COMPLETED | OUTPATIENT
Start: 2023-12-12 | End: 2023-12-12

## 2023-12-12 RX ORDER — NAPROXEN 500 MG/1
500 TABLET ORAL ONCE
Status: COMPLETED | OUTPATIENT
Start: 2023-12-12 | End: 2023-12-12

## 2023-12-12 RX ADMIN — NAPROXEN 500 MG: 500 TABLET ORAL at 22:44

## 2023-12-12 RX ADMIN — OXYCODONE HYDROCHLORIDE AND ACETAMINOPHEN 1 TABLET: 5; 325 TABLET ORAL at 22:44

## 2023-12-12 ASSESSMENT — COLUMBIA-SUICIDE SEVERITY RATING SCALE - C-SSRS
2. HAVE YOU ACTUALLY HAD ANY THOUGHTS OF KILLING YOURSELF?: NO
6. HAVE YOU EVER DONE ANYTHING, STARTED TO DO ANYTHING, OR PREPARED TO DO ANYTHING TO END YOUR LIFE?: NO
1. IN THE PAST MONTH, HAVE YOU WISHED YOU WERE DEAD OR WISHED YOU COULD GO TO SLEEP AND NOT WAKE UP?: NO

## 2023-12-12 NOTE — Clinical Note
Amanuel Vergara was seen and treated in our emergency department on 12/12/2023.  She may return to work on 12/15/2023.       If you have any questions or concerns, please don't hesitate to call.      Fadumo Dobbins PA-C

## 2023-12-12 NOTE — PROGRESS NOTES
EDPD Note: Antibiotics Reviewed and Warranted    Contacted Ms. Amanuel Vergara regarding a positive wound culture that was taken during their recent emergency room visit. I completed education with patient . The patient is being treated with Bactrim DS BID x 7 days. Patient states she was told to take it every 6 hours and thus has been taking it as told. Told patient that it should be every 12 hours or twice daily and to continue with twice daily dosing. The bactrim does not cover for anaerobes. Patient is also being treated with metronidazole 500 mg BID x 7 days for BV which does cover anaerobes. Patient states she is still in a lot of pain from her recent procedure. Patient was given naproxen 500 mg BID as needed for the pain. Patient states she has been taking it but still in a lot of pain. Recommended if pain worsens, or any fever or alarming symptoms, to be seen.    Susceptibility data from last 90 days.  Collected Specimen Info Organism   12/09/23 Tissue/Biopsy from Buttock Mixed Anaerobic Bacteria   12/09/23 Tissue/Biopsy from Buttock Mixed Skin Microorganisms      Mixed Anaerobic Bacteria        No further follow up needed from EDPD Team.     Mamie Seth, PharmD

## 2023-12-13 RX ORDER — OXYCODONE AND ACETAMINOPHEN 5; 325 MG/1; MG/1
1 TABLET ORAL EVERY 6 HOURS PRN
Qty: 5 TABLET | Refills: 0 | Status: SHIPPED | OUTPATIENT
Start: 2023-12-13 | End: 2023-12-16

## 2023-12-13 RX ORDER — KETOROLAC TROMETHAMINE 10 MG/1
10 TABLET, FILM COATED ORAL EVERY 6 HOURS PRN
Qty: 12 TABLET | Refills: 0 | Status: SHIPPED | OUTPATIENT
Start: 2023-12-13 | End: 2023-12-16

## 2023-12-13 NOTE — ED PROVIDER NOTES
HPI   Chief Complaint   Patient presents with    Abscess       HPI: Patient is a 28-year-old female who presents to the ED for groin pain and fall in the shower.  Patient states that she was here in the ED a few days ago where she had a labial abscess drained.  She has since been on Bactrim and has been compliant with this.  She states that today she went to take a shower and when the hot water hit her wound that the pain was so bad that she passed out and thinks that she may have hit her head because she is having a headache.  Currently rates her groin pain an 8 out of 10 in severity.  States that she has follow-up in 2 days to have this removed.  Denies any fevers, chills, drainage, swelling.  ------------------------------------------------------------------------------------------------------------------------------------------  ROS: a ten point review of systems was performed and was negative except as per HPI.  ------------------------------------------------------------------------------------------------------------------------------------------  PMH / PSH: as per HPI, otherwise reviewed   MEDS: as per HPI, otherwise reviewed in EMR  ALLERGIES: as per HPI, otherwise reviewed in EMR  SocH:  as per HPI, otherwise reviewed in EMR  FH:  as per HPI, otherwise reviewed in EMR   ------------------------------------------------------------------------------------------------------------------------------------------  Physical Exam:  VS: As documented in the triage note and EMR flowsheet from this visit was reviewed  General: Well appearing. No acute distress.   Eyes:  Extraocular movements grossly intact. No scleral icterus.   Head: Atraumatic. Normocephalic.     Neck: No meningismus. No gross masses. Full movement through range of motion  ENT: Posterior oropharynx shows no erythema, exudate or edema.  Uvula is midline without edema.  No stridor or trismus  CV: Regular rhythm. No murmurs, rubs, gallops appreciated.    Resp: Clear to auscultation bilaterally. No respiratory distress.    GI: Nontender. Soft. No masses. No rebound, rigidity or guarding.   : Seton in place to the left labial area.  No surrounding erythema, edema or discharge.  MSK: Symmetric muscle bulk. No gross step offs or deformities.  Skin: Warm, dry. No rashes  Neuro: CN II-VII intact. A&O x3. Speech fluent. Alert. Moving all extremities. Ambulates with normal gait  Psych: Appropriate mood and affect for situation  ------------------------------------------------------------------------------------------------------------------------------------------  Hospital Course / Medical Decision Making: Patient is a 28-year-old female who presents to the ED for groin pain after having an I&D of labial abscess drained 2 days ago.  She states that today when she was in the shower the water hit the wound and it hurt so bad that she syncopized and hit her head.  On examination, she has no external signs of head trauma on examination.  On  exam she has a seton in place to the left labial area where [facility] drained the abscess 2 days ago.  The area appears to be healing well.  There is no surrounding evidence of infection.  Patient was administered naproxen and Percocet.  CT head was obtained given her fall and negative for any acute intracranial abnormality. On reassessment, patient feels markedly improved. States that she has follow up to have her seton removed on 12/14. She was given Rx for Toradol and Percocet to take in the instance of severe pain. Patient has remained hemodynamically stable throughout the course of their ED stay.  Patient is home-going.  Patient advised to return to the ED for any worsening symptoms.  Advised to follow-up with PCP.  Patient was discharged in stable condition.                                No data recorded                Patient History   Past Medical History:   Diagnosis Date    Contact with and (suspected) exposure to infections  with a predominantly sexual mode of transmission     Exposure to STD     Past Surgical History:   Procedure Laterality Date    CT ANGIO NECK  3/1/2023    CT NECK ANGIO W AND WO IV CONTRAST [facility] CT     No family history on file.  Social History     Tobacco Use    Smoking status: Not on file    Smokeless tobacco: Not on file   Substance Use Topics    Alcohol use: Not on file    Drug use: Not on file       Physical Exam   ED Triage Vitals [12/12/23 2145]   Temp Heart Rate Resp BP   36.9 °C (98.4 °F) 104 18 159/89      SpO2 Temp src Heart Rate Source Patient Position   98 % -- -- --      BP Location FiO2 (%)     -- --       Physical Exam    ED Course & MDM   Diagnoses as of 12/13/23 0046   Fall, initial encounter   Left inguinal pain       Medical Decision Making      Procedure  Procedures     Fadumo Dobbins PA-C  12/13/23 0052

## 2023-12-13 NOTE — ED TRIAGE NOTES
Pt has abscess drained in the perineum 2 days ago and reports back to ED with severe pain. Pt is tearful in triage. States she remains taking ATB. Denies fever, chills, N/V.

## 2023-12-15 ENCOUNTER — HOSPITAL ENCOUNTER (EMERGENCY)
Facility: HOSPITAL | Age: 28
Discharge: HOME | End: 2023-12-15
Attending: EMERGENCY MEDICINE
Payer: COMMERCIAL

## 2023-12-15 VITALS
SYSTOLIC BLOOD PRESSURE: 118 MMHG | BODY MASS INDEX: 30.73 KG/M2 | DIASTOLIC BLOOD PRESSURE: 73 MMHG | WEIGHT: 180 LBS | HEART RATE: 97 BPM | RESPIRATION RATE: 16 BRPM | HEIGHT: 64 IN | TEMPERATURE: 97.7 F | OXYGEN SATURATION: 99 %

## 2023-12-15 DIAGNOSIS — Z51.89 WOUND CHECK, ABSCESS: Primary | ICD-10-CM

## 2023-12-15 PROCEDURE — 99284 EMERGENCY DEPT VISIT MOD MDM: CPT | Performed by: EMERGENCY MEDICINE

## 2023-12-15 PROCEDURE — 99284 EMERGENCY DEPT VISIT MOD MDM: CPT

## 2023-12-15 PROCEDURE — 99281 EMR DPT VST MAYX REQ PHY/QHP: CPT | Performed by: EMERGENCY MEDICINE

## 2023-12-15 ASSESSMENT — COLUMBIA-SUICIDE SEVERITY RATING SCALE - C-SSRS
6. HAVE YOU EVER DONE ANYTHING, STARTED TO DO ANYTHING, OR PREPARED TO DO ANYTHING TO END YOUR LIFE?: NO
2. HAVE YOU ACTUALLY HAD ANY THOUGHTS OF KILLING YOURSELF?: NO
1. IN THE PAST MONTH, HAVE YOU WISHED YOU WERE DEAD OR WISHED YOU COULD GO TO SLEEP AND NOT WAKE UP?: NO

## 2023-12-16 NOTE — DISCHARGE INSTRUCTIONS
Your wound looks well-healed and the abscess appears to have drained appropriately.  Please continue to take your antibiotics as prescribed and follow-up with your primary care provider.

## 2023-12-16 NOTE — ED TRIAGE NOTES
"Patient states that she had an abscess in her groin that was drained recently here. She states that there is a \"rubber band\" that needs to be removed. Patient states that she has had continued pain to her groin and that it needs to be removed in the ER today.    "

## 2023-12-16 NOTE — ED PROVIDER NOTES
HPI   Chief Complaint   Patient presents with    Groin Pain       HPI  28-year-old female with recent gluteal abscess who presents for drain removal.  Patient recently had a gluteal abscess drained with subsequent drain placement 1 week prior.  She states she has been compliant on home antibiotics and the swelling has gone down significantly.  She denies any fevers or chills.                  Nad[place] Coma Scale Score: 15                  Patient History   Past Medical History:   Diagnosis Date    Contact with and (suspected) exposure to infections with a predominantly sexual mode of transmission     Exposure to STD     Past Surgical History:   Procedure Laterality Date    CT ANGIO NECK  3/1/2023    CT NECK ANGIO W AND WO IV CONTRAST [facility] CT     No family history on file.  Social History     Tobacco Use    Smoking status: Not on file    Smokeless tobacco: Not on file   Substance Use Topics    Alcohol use: Not on file    Drug use: Not on file       Physical Exam   ED Triage Vitals [12/15/23 2030]   Temp Heart Rate Resp BP   36.5 °C (97.7 °F) 97 16 118/73      SpO2 Temp Source Heart Rate Source Patient Position   99 % Temporal Monitor --      BP Location FiO2 (%)     -- --       Physical Exam  Vitals and nursing note reviewed.   Constitutional:       Appearance: Normal appearance.   HENT:      Head: Normocephalic.      Mouth/Throat:      Mouth: Mucous membranes are moist.   Eyes:      Conjunctiva/sclera: Conjunctivae normal.   Cardiovascular:      Rate and Rhythm: Normal rate.   Pulmonary:      Effort: Pulmonary effort is normal.   Abdominal:      General: Abdomen is flat.   Genitourinary:      Neurological:      Mental Status: She is alert and oriented to person, place, and time.   Psychiatric:         Mood and Affect: Mood normal.         ED Course & MDM   Diagnoses as of 12/15/23 1500   Wound check, abscess       Medical Decision Making  28-year-old female with recent gluteal abscess s/p I&D who presents for wound  drain removal.  On exam, patient's vitals are normal, she is in no acute distress and nontoxic-appearing, there is a vessel loop sutured in place between the left gluteus and left labia with no surrounding erythema, no appreciable drainage.  Given the drain was placed by acute care surgery, they were consulted to evaluate the patient to ensure okay for removal.    The drain was removed by surgery, as the wound looks clean and dry.  Patient reports she still has antibiotics at home for which I encouraged her to continue to take as prescribed.  Patient was subsequently discharged home in stable condition.    Procedure  Procedures     Jordin Nesbitt, DO  Resident  12/15/23 7918

## 2023-12-16 NOTE — CONSULTS
"Reason For Consult  Seton removal    History Of Present Illness  Amanuel Vergara is a 28 y.o. female who presented to the ED on 12/9 with a left gluteal abscess. She underwent incision and drainage of the abscess and seton placement by acute care surgery and was discharged home on a course of Bactrim, which she completed. States she has felt well at home since discharge but is having some discomfort around the seton. She has not had any drainage from her wound for the past several days. Denies fevers/chills at home or erythema around the wound. She presents to the ED requesting seton be removed.     Past Medical History  She has a past medical history of Contact with and (suspected) exposure to infections with a predominantly sexual mode of transmission.    Surgical History  She has a past surgical history that includes CT angio neck (3/1/2023).     Social History  She has no history on file for tobacco use, alcohol use, and drug use.    Family History  No family history on file.     Allergies  Patient has no known allergies.    Review of Systems  Negative except per HPI     Physical Exam  General: resting in bed, NAD  HEENT: normocephalic, atraumatic  CV: regular rate and rhythm  Pulm: nonlabored respiratory effort on room air  Abd: soft, nontender, nondistended  Perineum: seton in place in left gluteal fold, no drainage, wound appears well healed with no surrounding erythema  MSK: PATRICIO  Psych: appropriate affect     Last Recorded Vitals  Blood pressure 118/73, pulse 97, temperature 36.5 °C (97.7 °F), temperature source Temporal, resp. rate 16, height 1.626 m (5' 4\"), weight 81.6 kg (180 lb), SpO2 99 %.       Assessment/Plan   27yo F with recent hx of left gluteal abscess s/p I&D and seton placement on 12/9 now presenting for seton removal. She completed a course of Bactrim. Wound appears well healed with no drainage, fluctuance, or cellulitis.    Recommendations:    - Seton removed  - Okay to discharge home per " ED  - Patient encouraged to return to ED for fevers/chills, worsening pain, or purulent drainage    Discussed with Dr. Vanesa Hamilton MD  Acute Care Surgery [phone]

## 2024-05-20 ENCOUNTER — TELEPHONE (OUTPATIENT)
Dept: OBSTETRICS AND GYNECOLOGY | Facility: CLINIC | Age: 29
End: 2024-05-20
Payer: COMMERCIAL

## 2024-05-20 NOTE — TELEPHONE ENCOUNTER
PT called to schedule an appt for Nexplanon removal and reinsertion but hasn't been seen in a few years. Scheduled her for her ANNUAL first. Per DAIANAK

## 2024-06-06 ENCOUNTER — APPOINTMENT (OUTPATIENT)
Dept: OBSTETRICS AND GYNECOLOGY | Facility: CLINIC | Age: 29
End: 2024-06-06
Payer: COMMERCIAL

## 2024-06-10 ENCOUNTER — PROCEDURE VISIT (OUTPATIENT)
Dept: OBSTETRICS AND GYNECOLOGY | Facility: CLINIC | Age: 29
End: 2024-06-10
Payer: COMMERCIAL

## 2024-06-10 VITALS — DIASTOLIC BLOOD PRESSURE: 74 MMHG | BODY MASS INDEX: 33.3 KG/M2 | WEIGHT: 194 LBS | SYSTOLIC BLOOD PRESSURE: 113 MMHG

## 2024-06-10 DIAGNOSIS — Z30.017 ENCOUNTER FOR INSERTION OF SUBDERMAL CONTRACEPTIVE: Primary | ICD-10-CM

## 2024-06-10 DIAGNOSIS — Z30.46 ENCOUNTER FOR REMOVAL AND REINSERTION OF NEXPLANON: ICD-10-CM

## 2024-06-10 PROCEDURE — 11983 REMOVE/INSERT DRUG IMPLANT: CPT | Performed by: OBSTETRICS & GYNECOLOGY

## 2024-06-10 RX ORDER — ETONOGESTREL 68 MG/1
68 IMPLANT SUBCUTANEOUS
COMMUNITY

## 2024-06-10 NOTE — PROGRESS NOTES
Patient ID: Amanuel Vergara is a 29 y.o. female for Nexplanon removal and reinsertion.  This is her third Nexplanon.  She voices no concerns.    Insertion of Contraceptive Capsule    Date/Time: 6/10/2024 2:44 PM    Performed by: Nurys Terrazas MD  Authorized by: Nurys Terrazas MD    Consent:     Consent obtained:  Written    Consent given by:  Patient    Procedural risks discussed:  Bleeding and infection    Patient questions answered: yes      Patient agrees, verbalizes understanding, and wants to proceed: yes      Educational handouts given: yes      Instructions and paperwork completed: yes    Indication:     Indication: Presence of non-biodegradable drug delivery implant    Pre-procedure:     Pre-procedure timeout performed: yes      Prepped with: povidone-iodine      Local anesthetic:  Lidocaine without epinephrine    The site was cleaned and prepped in a sterile fashion: yes    Procedure:     Procedure:  Removal with reinsertion    Small stab incision was made in arm: yes      Left/right:  Left    Preloaded contraceptive capsule trocar was placed subdermally: yes      Visualization of implant was obtained: yes      Contraceptive capsule was inserted and trocar removed: yes      Visualization of notch in stylet and palpation of device: yes      Palpation confirms placement by provider and patient: yes      Site was closed with steri-strips and pressure bandage applied: yes    Comments:      This is a 29-year-old female that had an uncomplicated removal of  Nexplanon with reinsertion.  This is her third Nexplanon.    We discussed to monitor for signs of infection.  She does have routine follow-up in 2024.

## 2024-08-20 ENCOUNTER — HOSPITAL ENCOUNTER (EMERGENCY)
Facility: HOSPITAL | Age: 29
Discharge: HOME | End: 2024-08-20
Payer: COMMERCIAL

## 2024-08-20 VITALS
OXYGEN SATURATION: 99 % | HEART RATE: 75 BPM | RESPIRATION RATE: 18 BRPM | BODY MASS INDEX: 33.31 KG/M2 | TEMPERATURE: 97.5 F | SYSTOLIC BLOOD PRESSURE: 109 MMHG | HEIGHT: 64 IN | WEIGHT: 195.11 LBS | DIASTOLIC BLOOD PRESSURE: 72 MMHG

## 2024-08-20 DIAGNOSIS — N76.0 BV (BACTERIAL VAGINOSIS): Primary | ICD-10-CM

## 2024-08-20 DIAGNOSIS — B96.89 BV (BACTERIAL VAGINOSIS): Primary | ICD-10-CM

## 2024-08-20 LAB
APPEARANCE UR: ABNORMAL
BILIRUB UR STRIP.AUTO-MCNC: NEGATIVE MG/DL
CLUE CELLS SPEC QL WET PREP: PRESENT
COLOR UR: YELLOW
GLUCOSE UR STRIP.AUTO-MCNC: NORMAL MG/DL
KETONES UR STRIP.AUTO-MCNC: ABNORMAL MG/DL
LEUKOCYTE ESTERASE UR QL STRIP.AUTO: NEGATIVE
MUCOUS THREADS #/AREA URNS AUTO: ABNORMAL /LPF
NITRITE UR QL STRIP.AUTO: NEGATIVE
PH UR STRIP.AUTO: 6.5 [PH]
PREGNANCY TEST URINE, POC: NEGATIVE
PROT UR STRIP.AUTO-MCNC: ABNORMAL MG/DL
RBC # UR STRIP.AUTO: ABNORMAL /UL
RBC #/AREA URNS AUTO: >20 /HPF
SP GR UR STRIP.AUTO: 1.02
SQUAMOUS #/AREA URNS AUTO: ABNORMAL /HPF
T VAGINALIS SPEC QL WET PREP: ABNORMAL
TRICHOMONAS REFLEX COMMENT: ABNORMAL
UROBILINOGEN UR STRIP.AUTO-MCNC: NORMAL MG/DL
WBC #/AREA URNS AUTO: ABNORMAL /HPF
WBC VAG QL WET PREP: ABNORMAL
YEAST VAG QL WET PREP: ABNORMAL

## 2024-08-20 PROCEDURE — 87661 TRICHOMONAS VAGINALIS AMPLIF: CPT | Performed by: PHYSICIAN ASSISTANT

## 2024-08-20 PROCEDURE — 81001 URINALYSIS AUTO W/SCOPE: CPT | Performed by: PHYSICIAN ASSISTANT

## 2024-08-20 PROCEDURE — 99283 EMERGENCY DEPT VISIT LOW MDM: CPT | Performed by: PHYSICIAN ASSISTANT

## 2024-08-20 PROCEDURE — 87491 CHLMYD TRACH DNA AMP PROBE: CPT | Performed by: PHYSICIAN ASSISTANT

## 2024-08-20 PROCEDURE — 99284 EMERGENCY DEPT VISIT MOD MDM: CPT | Performed by: PHYSICIAN ASSISTANT

## 2024-08-20 PROCEDURE — 87210 SMEAR WET MOUNT SALINE/INK: CPT | Performed by: PHYSICIAN ASSISTANT

## 2024-08-20 PROCEDURE — 81025 URINE PREGNANCY TEST: CPT | Performed by: PHYSICIAN ASSISTANT

## 2024-08-20 RX ORDER — METRONIDAZOLE 500 MG/1
500 TABLET ORAL 2 TIMES DAILY
Qty: 14 TABLET | Refills: 0 | Status: SHIPPED | OUTPATIENT
Start: 2024-08-20 | End: 2024-08-28

## 2024-08-20 ASSESSMENT — PAIN - FUNCTIONAL ASSESSMENT: PAIN_FUNCTIONAL_ASSESSMENT: 0-10

## 2024-08-20 ASSESSMENT — PAIN SCALES - GENERAL: PAINLEVEL_OUTOF10: 0 - NO PAIN

## 2024-08-20 ASSESSMENT — PAIN DESCRIPTION - PROGRESSION: CLINICAL_PROGRESSION: NOT CHANGED

## 2024-08-20 NOTE — ED PROVIDER NOTES
"HPI   Chief Complaint   Patient presents with    Exposure to STD       HPI: Patient is a 30-year-old female who presents to the ED for concern of STD.  Patient states that when wiping during using the bathroom she notices a \"irritated bump\" that she states that she would like looked at.  She denies any foul odor, discharge, pruritus or abdominal pain.  States that she would just like this to be checked to make sure is not STD related.  States that she has been with the same sexual partner for the past 6 years but still wants to be side.  ------------------------------------------------------------------------------------------------------------------------------------------  ROS: a ten point review of systems was performed and was negative except as per HPI.  ------------------------------------------------------------------------------------------------------------------------------------------  PMH / PSH: as per HPI, otherwise reviewed   MEDS: as per HPI, otherwise reviewed in EMR  ALLERGIES: as per HPI, otherwise reviewed in EMR  SocH:  as per HPI, otherwise reviewed in EMR  FH:  as per HPI, otherwise reviewed in EMR   ------------------------------------------------------------------------------------------------------------------------------------------  Physical Exam:  VS: As documented in the triage note and EMR flowsheet from this visit was reviewed  General: Well appearing. No acute distress.   Eyes:  Extraocular movements grossly intact. No scleral icterus.   Head: Atraumatic. Normocephalic.     Neck: No meningismus. No gross masses. Full movement through range of motion  CV: Regular rhythm. No murmurs, rubs, gallops appreciated.   Resp: Clear to auscultation bilaterally. No respiratory distress.    GI: Nontender. Soft. No masses. No rebound, rigidity or guarding.  PELVIC EXAM: Chaperone present. Speculum exam shows no discharge, ulcerations, lesions. Bimanual exam shows no adnexal pain or mass. No " cervical motion tenderness.  Cervical os is closed.  Scant amount of blood in the vaginal vault.+malodor  MSK: Symmetric muscle bulk. No gross step offs or deformities.  Skin: Warm, dry. No rashes  Neuro: CN II-VII intact. A&O x3. Speech fluent. Alert. Moving all extremities. Ambulates with normal gait  Psych: Appropriate mood and affect for situation  ------------------------------------------------------------------------------------------------------------------------------------------  Hospital Course / Medical Decision Making: Patient is a 30-year-old female presents to the ED for concern of STD.  States that she has been noticing an irritated bump when wiping after using the restroom.  On examination, patient well-appearing.  She denies any associated symptoms.  Pelvic examination revealed no copious discharge, cervical motion or adnexal tenderness. UA negative for any evidence of infection. Wet prep was negative for any clue cells, trichomonas or yeast. She was provided a Rx for Flagyl BID for the next week. As a result of the work-up, the patient was discharged home in stable condition.  They were informed of the diagnosis and instructed to come back with any concerns or worsening of condition.  Agreeable to the plan as discussed above.  Patient given the opportunity to ask questions.  All of the patient's questions were answered.                 Patient History   Past Medical History:   Diagnosis Date    Contact with and (suspected) exposure to infections with a predominantly sexual mode of transmission     Exposure to STD     Past Surgical History:   Procedure Laterality Date    CT ANGIO NECK  3/1/2023    CT NECK ANGIO W AND WO IV CONTRAST [facility] CT     No family history on file.  Social History     Tobacco Use    Smoking status: Not on file    Smokeless tobacco: Not on file   Substance Use Topics    Alcohol use: Not on file    Drug use: Not on file       Physical Exam   ED Triage Vitals [08/20/24 1709]    Temperature Heart Rate Respirations BP   36.4 °C (97.5 °F) 75 18 109/72      Pulse Ox Temp src Heart Rate Source Patient Position   99 % -- -- --      BP Location FiO2 (%)     -- --       Physical Exam      ED Course & MDM   Diagnoses as of 08/20/24 2112   BV (bacterial vaginosis)                 No data recorded     Nadia Coma Scale Score: 15 (08/20/24 1710 : Jania Brenner RN)                           Medical Decision Making      Procedure  Procedures     Fadumo Dobbins PA-C  08/20/24 2114

## 2024-08-21 ENCOUNTER — PHARMACY VISIT (OUTPATIENT)
Dept: PHARMACY | Facility: CLINIC | Age: 29
End: 2024-08-21
Payer: MEDICAID

## 2024-08-21 LAB
C TRACH RRNA SPEC QL NAA+PROBE: NEGATIVE
N GONORRHOEA DNA SPEC QL PROBE+SIG AMP: NEGATIVE
T VAGINALIS RRNA SPEC QL NAA+PROBE: NEGATIVE

## 2024-08-21 PROCEDURE — RXMED WILLOW AMBULATORY MEDICATION CHARGE

## 2024-08-26 ENCOUNTER — APPOINTMENT (OUTPATIENT)
Dept: OBSTETRICS AND GYNECOLOGY | Facility: CLINIC | Age: 29
End: 2024-08-26
Payer: COMMERCIAL